# Patient Record
Sex: MALE | Race: WHITE | NOT HISPANIC OR LATINO | Employment: OTHER
[De-identification: names, ages, dates, MRNs, and addresses within clinical notes are randomized per-mention and may not be internally consistent; named-entity substitution may affect disease eponyms.]

---

## 2018-02-23 ENCOUNTER — RX ONLY (RX ONLY)
Age: 73
End: 2018-02-23

## 2018-02-23 ENCOUNTER — DOCTOR'S OFFICE (OUTPATIENT)
Dept: URBAN - NONMETROPOLITAN AREA CLINIC 1 | Facility: CLINIC | Age: 73
Setting detail: OPHTHALMOLOGY
End: 2018-02-23
Payer: COMMERCIAL

## 2018-02-23 DIAGNOSIS — H25.23: ICD-10-CM

## 2018-02-23 DIAGNOSIS — H25.012: ICD-10-CM

## 2018-02-23 DIAGNOSIS — H25.13: ICD-10-CM

## 2018-02-23 DIAGNOSIS — H25.043: ICD-10-CM

## 2018-02-23 DIAGNOSIS — E11.9: ICD-10-CM

## 2018-02-23 PROCEDURE — 92004 COMPRE OPH EXAM NEW PT 1/>: CPT | Performed by: OPHTHALMOLOGY

## 2018-02-23 PROCEDURE — 76512 OPH US DX B-SCAN: CPT | Performed by: OPHTHALMOLOGY

## 2018-02-23 ASSESSMENT — REFRACTION_MANIFEST
OS_VA3: 20/
OS_VA1: 20/
OU_VA: 20/
OS_VA2: 20/
OD_VA1: 20/
OS_VA1: 20/
OS_VA3: 20/
OD_VA3: 20/
OS_VA2: 20/
OD_VA3: 20/
OD_VA3: 20/
OU_VA: 20/
OD_VA2: 20/
OU_VA: 20/
OS_VA1: 20/
OD_VA2: 20/
OD_VA2: 20/
OS_VA2: 20/
OS_VA3: 20/
OD_VA1: 20/
OD_VA1: 20/

## 2018-02-23 ASSESSMENT — CONFRONTATIONAL VISUAL FIELD TEST (CVF)
OS_FINDINGS: FULL
OD_FINDINGS: FULL

## 2018-02-23 ASSESSMENT — REFRACTION_CURRENTRX
OD_OVR_VA: 20/
OD_CYLINDER: -0.25
OS_SPHERE: +0.75
OD_OVR_VA: 20/
OD_ADD: +2.75
OD_AXIS: 068
OS_OVR_VA: 20/
OS_AXIS: 091
OD_SPHERE: +0.75
OS_ADD: +2.75
OD_VPRISM_DIRECTION: BF
OS_VPRISM_DIRECTION: BF
OD_OVR_VA: 20/
OS_CYLINDER: -1.00
OS_OVR_VA: 20/
OS_OVR_VA: 20/

## 2018-02-23 ASSESSMENT — REFRACTION_AUTOREFRACTION
OD_SPHERE: 0.00
OD_CYLINDER: -1.00
OS_CYLINDER: -0.50
OS_AXIS: 092
OS_SPHERE: 0.00
OD_AXIS: 048

## 2018-02-23 ASSESSMENT — SPHEQUIV_DERIVED
OD_SPHEQUIV: -0.5
OS_SPHEQUIV: -0.25

## 2018-02-23 ASSESSMENT — VISUAL ACUITY
OD_BCVA: 20/100-1
OS_BCVA: 20/60+2

## 2018-03-20 ENCOUNTER — DOCTOR'S OFFICE (OUTPATIENT)
Dept: URBAN - NONMETROPOLITAN AREA CLINIC 1 | Facility: CLINIC | Age: 73
Setting detail: OPHTHALMOLOGY
End: 2018-03-20
Payer: COMMERCIAL

## 2018-03-20 DIAGNOSIS — H25.21: ICD-10-CM

## 2018-03-20 PROCEDURE — 92136 OPHTHALMIC BIOMETRY: CPT | Performed by: OPHTHALMOLOGY

## 2018-04-05 ENCOUNTER — AMBUL SURGICAL CARE (OUTPATIENT)
Dept: URBAN - NONMETROPOLITAN AREA SURGERY 1 | Facility: SURGERY | Age: 73
Setting detail: OPHTHALMOLOGY
End: 2018-04-05
Payer: COMMERCIAL

## 2018-04-05 DIAGNOSIS — H25.041: ICD-10-CM

## 2018-04-05 DIAGNOSIS — H25.11: ICD-10-CM

## 2018-04-05 PROCEDURE — 66984 XCAPSL CTRC RMVL W/O ECP: CPT | Performed by: OPHTHALMOLOGY

## 2018-04-05 PROCEDURE — G8918 PT W/O PREOP ORDER IV AB PRO: HCPCS | Performed by: OPHTHALMOLOGY

## 2018-04-05 PROCEDURE — G8907 PT DOC NO EVENTS ON DISCHARG: HCPCS | Performed by: OPHTHALMOLOGY

## 2018-04-06 ENCOUNTER — RX ONLY (RX ONLY)
Age: 73
End: 2018-04-06

## 2018-04-06 ENCOUNTER — DOCTOR'S OFFICE (OUTPATIENT)
Dept: URBAN - NONMETROPOLITAN AREA CLINIC 1 | Facility: CLINIC | Age: 73
Setting detail: OPHTHALMOLOGY
End: 2018-04-06
Payer: COMMERCIAL

## 2018-04-06 ENCOUNTER — DOCTOR'S OFFICE (OUTPATIENT)
Dept: URBAN - NONMETROPOLITAN AREA CLINIC 1 | Facility: CLINIC | Age: 73
Setting detail: OPHTHALMOLOGY
End: 2018-04-06

## 2018-04-06 DIAGNOSIS — Z96.1: ICD-10-CM

## 2018-04-06 DIAGNOSIS — H25.22: ICD-10-CM

## 2018-04-06 DIAGNOSIS — H25.012: ICD-10-CM

## 2018-04-06 DIAGNOSIS — H25.12: ICD-10-CM

## 2018-04-06 DIAGNOSIS — H25.042: ICD-10-CM

## 2018-04-06 PROCEDURE — 99024 POSTOP FOLLOW-UP VISIT: CPT | Performed by: OPTOMETRIST

## 2018-04-06 PROCEDURE — 92136 OPHTHALMIC BIOMETRY: CPT | Performed by: OPHTHALMOLOGY

## 2018-04-06 ASSESSMENT — REFRACTION_CURRENTRX
OS_OVR_VA: 20/
OD_OVR_VA: 20/
OS_OVR_VA: 20/
OS_ADD: +2.75
OD_VPRISM_DIRECTION: BF
OS_CYLINDER: -1.00
OS_SPHERE: +0.75
OS_OVR_VA: 20/
OD_OVR_VA: 20/
OD_ADD: +2.75
OD_AXIS: 068
OS_VPRISM_DIRECTION: BF
OD_OVR_VA: 20/
OD_CYLINDER: -0.25
OS_AXIS: 091
OD_SPHERE: +0.75

## 2018-04-06 ASSESSMENT — VISUAL ACUITY
OD_BCVA: 20/100-1
OS_BCVA: 20/40-2

## 2018-04-06 ASSESSMENT — REFRACTION_MANIFEST
OD_VA3: 20/
OS_VA1: 20/
OD_VA1: 20/
OS_VA1: 20/
OS_VA1: 20/
OS_VA3: 20/
OU_VA: 20/
OS_VA2: 20/
OD_VA1: 20/
OS_VA2: 20/
OD_VA2: 20/
OU_VA: 20/
OD_VA1: 20/
OS_VA3: 20/
OD_VA3: 20/
OU_VA: 20/
OS_VA3: 20/
OD_VA2: 20/
OD_VA2: 20/
OS_VA2: 20/
OD_VA3: 20/

## 2018-04-06 ASSESSMENT — REFRACTION_AUTOREFRACTION
OS_SPHERE: 0.00
OD_SPHERE: +0.50
OD_CYLINDER: -0.25
OS_AXIS: 092
OS_CYLINDER: -0.50
OD_AXIS: 017

## 2018-04-06 ASSESSMENT — SPHEQUIV_DERIVED
OS_SPHEQUIV: -0.25
OD_SPHEQUIV: 0.375

## 2018-04-06 ASSESSMENT — DRY EYES - PHYSICIAN NOTES: OD_GENERALCOMMENTS: SCATTERED INFERIOR SPK

## 2018-04-12 ENCOUNTER — AMBUL SURGICAL CARE (OUTPATIENT)
Dept: URBAN - NONMETROPOLITAN AREA SURGERY 1 | Facility: SURGERY | Age: 73
Setting detail: OPHTHALMOLOGY
End: 2018-04-12
Payer: COMMERCIAL

## 2018-04-12 DIAGNOSIS — H25.12: ICD-10-CM

## 2018-04-12 DIAGNOSIS — H25.012: ICD-10-CM

## 2018-04-12 DIAGNOSIS — H25.042: ICD-10-CM

## 2018-04-12 PROCEDURE — G8907 PT DOC NO EVENTS ON DISCHARG: HCPCS | Performed by: OPHTHALMOLOGY

## 2018-04-12 PROCEDURE — G8918 PT W/O PREOP ORDER IV AB PRO: HCPCS | Performed by: OPHTHALMOLOGY

## 2018-04-12 PROCEDURE — 66984 XCAPSL CTRC RMVL W/O ECP: CPT | Performed by: OPHTHALMOLOGY

## 2018-04-13 ENCOUNTER — DOCTOR'S OFFICE (OUTPATIENT)
Dept: URBAN - NONMETROPOLITAN AREA CLINIC 1 | Facility: CLINIC | Age: 73
Setting detail: OPHTHALMOLOGY
End: 2018-04-13

## 2018-04-13 DIAGNOSIS — Z96.1: ICD-10-CM

## 2018-04-13 PROBLEM — H25.042 PSC POLAR AGE RELATED CATARACT; LEFT EYE: Status: RESOLVED | Noted: 2018-04-06 | Resolved: 2018-04-13

## 2018-04-13 PROBLEM — H25.012 CORTICAL CATARACT; LEFT EYE: Status: RESOLVED | Noted: 2018-02-23 | Resolved: 2018-04-13

## 2018-04-13 PROBLEM — H25.12 CATARACT NUCLEAR SCLEROSIS AGE RELATED; LEFT EYE: Status: RESOLVED | Noted: 2018-04-06 | Resolved: 2018-04-13

## 2018-04-13 PROCEDURE — 99024 POSTOP FOLLOW-UP VISIT: CPT | Performed by: PHYSICIAN ASSISTANT

## 2018-04-13 ASSESSMENT — REFRACTION_CURRENTRX
OS_CYLINDER: -1.00
OD_OVR_VA: 20/
OS_OVR_VA: 20/
OS_OVR_VA: 20/
OD_SPHERE: +0.75
OD_ADD: +2.75
OD_CYLINDER: -0.25
OD_AXIS: 068
OS_ADD: +2.75
OD_OVR_VA: 20/
OS_OVR_VA: 20/
OS_AXIS: 091
OS_SPHERE: +0.75
OS_VPRISM_DIRECTION: BF
OD_VPRISM_DIRECTION: BF
OD_OVR_VA: 20/

## 2018-04-13 ASSESSMENT — REFRACTION_MANIFEST
OD_VA3: 20/
OU_VA: 20/
OU_VA: 20/
OS_VA1: 20/
OS_VA3: 20/
OD_VA2: 20/
OD_VA1: 20/
OU_VA: 20/
OD_VA3: 20/
OD_VA2: 20/
OS_VA2: 20/
OD_VA3: 20/
OD_VA2: 20/
OD_VA1: 20/
OS_VA2: 20/
OS_VA1: 20/
OS_VA3: 20/
OS_VA3: 20/
OS_VA1: 20/
OD_VA1: 20/
OS_VA2: 20/

## 2018-04-13 ASSESSMENT — REFRACTION_AUTOREFRACTION
OS_SPHERE: +0.25
OD_CYLINDER: -1.00
OS_CYLINDER: -1.00
OD_AXIS: 096
OD_SPHERE: +1.00
OS_AXIS: 086

## 2018-04-13 ASSESSMENT — SPHEQUIV_DERIVED
OS_SPHEQUIV: -0.25
OD_SPHEQUIV: 0.5

## 2018-04-13 ASSESSMENT — VISUAL ACUITY
OS_BCVA: 20/20-2
OD_BCVA: 20/70

## 2018-04-25 ENCOUNTER — DOCTOR'S OFFICE (OUTPATIENT)
Dept: URBAN - NONMETROPOLITAN AREA CLINIC 1 | Facility: CLINIC | Age: 73
Setting detail: OPHTHALMOLOGY
End: 2018-04-25

## 2018-04-25 DIAGNOSIS — Z96.1: ICD-10-CM

## 2018-04-25 PROCEDURE — 99024 POSTOP FOLLOW-UP VISIT: CPT | Performed by: PHYSICIAN ASSISTANT

## 2018-04-27 ASSESSMENT — REFRACTION_MANIFEST
OS_VA1: 20/
OD_VA1: 20/
OD_VA2: 20/
OS_VA2: 20/
OS_VA2: 20/
OS_VA1: 20/
OS_VA3: 20/
OS_VA3: 20/
OD_VA3: 20/
OD_VA3: 20/
OD_VA2: 20/
OS_VA1: 20/
OD_VA1: 20/
OD_VA2: 20/
OD_VA1: 20/
OD_VA3: 20/
OU_VA: 20/
OS_VA3: 20/
OS_VA2: 20/
OU_VA: 20/
OU_VA: 20/

## 2018-04-27 ASSESSMENT — VISUAL ACUITY
OD_BCVA: 20/30+1
OS_BCVA: 20/20-2

## 2018-04-27 ASSESSMENT — SPHEQUIV_DERIVED
OD_SPHEQUIV: 0.5
OS_SPHEQUIV: 0.125

## 2018-04-27 ASSESSMENT — REFRACTION_CURRENTRX
OD_OVR_VA: 20/
OS_VPRISM_DIRECTION: BF
OD_CYLINDER: -0.25
OD_VPRISM_DIRECTION: BF
OD_OVR_VA: 20/
OD_ADD: +2.75
OS_OVR_VA: 20/
OS_SPHERE: +0.75
OD_OVR_VA: 20/
OS_OVR_VA: 20/
OS_AXIS: 091
OS_CYLINDER: -1.00
OS_ADD: +2.75
OD_AXIS: 068
OS_OVR_VA: 20/
OD_SPHERE: +0.75

## 2018-04-27 ASSESSMENT — REFRACTION_AUTOREFRACTION
OD_SPHERE: +1.00
OS_CYLINDER: -1.75
OD_CYLINDER: -1.00
OS_SPHERE: +1.00
OS_AXIS: 079
OD_AXIS: 099

## 2018-07-23 ENCOUNTER — DOCTOR'S OFFICE (OUTPATIENT)
Dept: URBAN - NONMETROPOLITAN AREA CLINIC 1 | Facility: CLINIC | Age: 73
Setting detail: OPHTHALMOLOGY
End: 2018-07-23
Payer: COMMERCIAL

## 2018-07-23 DIAGNOSIS — H52.4: ICD-10-CM

## 2018-07-23 PROCEDURE — 92015 DETERMINE REFRACTIVE STATE: CPT | Performed by: OPTOMETRIST

## 2018-07-23 ASSESSMENT — SPHEQUIV_DERIVED
OS_SPHEQUIV: 0.25
OD_SPHEQUIV: 0.375

## 2018-07-23 ASSESSMENT — REFRACTION_OUTSIDERX
OU_VA: 20/
OS_SPHERE: +0.50
OS_VA3: 20/
OS_VA2: 20/25
OD_VA1: 20/25
OS_AXIS: 075
OD_CYLINDER: -0.50
OD_SPHERE: +0.50
OS_ADD: +2.50
OS_VA1: 20/25
OD_AXIS: 090
OS_CYLINDER: -0.75
OD_VA2: 20/25
OD_ADD: +2.50
OD_VA3: 20/

## 2018-07-23 ASSESSMENT — REFRACTION_MANIFEST
OS_VA2: 20/
OS_VA1: 20/
OD_VA2: 20/
OS_VA2: 20/
OD_VA2: 20/
OD_VA3: 20/
OU_VA: 20/
OS_VA3: 20/
OD_VA1: 20/
OD_VA3: 20/
OD_VA1: 20/
OS_VA1: 20/
OS_VA3: 20/
OU_VA: 20/

## 2018-07-23 ASSESSMENT — REFRACTION_CURRENTRX
OD_SPHERE: +0.75
OS_ADD: +2.75
OS_VPRISM_DIRECTION: BF
OD_OVR_VA: 20/
OS_OVR_VA: 20/
OD_VPRISM_DIRECTION: BF
OS_OVR_VA: 20/
OD_AXIS: 068
OD_CYLINDER: -0.25
OS_AXIS: 091
OD_OVR_VA: 20/
OS_OVR_VA: 20/
OS_SPHERE: +0.75
OD_ADD: +2.75
OD_OVR_VA: 20/
OS_CYLINDER: -1.00

## 2018-07-23 ASSESSMENT — REFRACTION_AUTOREFRACTION
OS_CYLINDER: -1.00
OS_AXIS: 076
OD_CYLINDER: -0.75
OD_SPHERE: +0.75
OD_AXIS: 090
OS_SPHERE: +0.75

## 2018-07-23 ASSESSMENT — VISUAL ACUITY
OS_BCVA: 20/25-
OD_BCVA: 20/25-

## 2018-08-06 ENCOUNTER — OPTICAL OFFICE (OUTPATIENT)
Dept: URBAN - NONMETROPOLITAN AREA CLINIC 4 | Facility: CLINIC | Age: 73
Setting detail: OPHTHALMOLOGY
End: 2018-08-06
Payer: COMMERCIAL

## 2018-08-06 DIAGNOSIS — H52.223: ICD-10-CM

## 2018-08-06 PROCEDURE — V2750 ANTI-REFLECTIVE COATING: HCPCS | Performed by: OPTOMETRIST

## 2018-08-06 PROCEDURE — V2203 LENS SPHCYL BIFOCAL 4.00D/.1: HCPCS | Performed by: OPTOMETRIST

## 2018-08-06 PROCEDURE — V2744 TINT PHOTOCHROMATIC LENS/ES: HCPCS | Performed by: OPTOMETRIST

## 2018-08-06 PROCEDURE — V2020 VISION SVCS FRAMES PURCHASES: HCPCS | Performed by: OPTOMETRIST

## 2018-08-16 ENCOUNTER — DOCTOR'S OFFICE (OUTPATIENT)
Dept: URBAN - NONMETROPOLITAN AREA CLINIC 1 | Facility: CLINIC | Age: 73
Setting detail: OPHTHALMOLOGY
End: 2018-08-16
Payer: COMMERCIAL

## 2018-08-16 DIAGNOSIS — E11.9: ICD-10-CM

## 2018-08-16 DIAGNOSIS — H43.813: ICD-10-CM

## 2018-08-16 DIAGNOSIS — H43.812: ICD-10-CM

## 2018-08-16 DIAGNOSIS — H43.811: ICD-10-CM

## 2018-08-16 PROCEDURE — 92134 CPTRZ OPH DX IMG PST SGM RTA: CPT | Performed by: OPHTHALMOLOGY

## 2018-08-16 PROCEDURE — 92225 OPHTHALMOSCOPY EXTENDED INITIAL: CPT | Performed by: OPHTHALMOLOGY

## 2018-08-16 PROCEDURE — 92014 COMPRE OPH EXAM EST PT 1/>: CPT | Performed by: OPHTHALMOLOGY

## 2018-08-16 ASSESSMENT — VISUAL ACUITY
OS_BCVA: 20/30-2
OD_BCVA: 20/30-2

## 2018-08-16 ASSESSMENT — REFRACTION_MANIFEST
OD_VA1: 20/
OD_VA3: 20/
OS_VA1: 20/
OS_VA3: 20/
OU_VA: 20/
OU_VA: 20/
OS_VA2: 20/
OS_VA1: 20/
OD_VA1: 20/
OD_VA3: 20/
OD_VA2: 20/
OS_VA2: 20/
OS_VA3: 20/
OD_VA2: 20/

## 2018-08-16 ASSESSMENT — REFRACTION_AUTOREFRACTION
OS_SPHERE: +0.75
OD_CYLINDER: -0.75
OD_SPHERE: +0.75
OD_AXIS: 090
OS_CYLINDER: -1.00
OS_AXIS: 076

## 2018-08-16 ASSESSMENT — REFRACTION_OUTSIDERX
OD_AXIS: 090
OD_VA3: 20/
OD_ADD: +2.50
OD_VA2: 20/25
OD_VA1: 20/25
OS_VA1: 20/25
OS_SPHERE: +0.50
OS_AXIS: 075
OS_ADD: +2.50
OS_VA2: 20/25
OD_CYLINDER: -0.50
OS_VA3: 20/
OD_SPHERE: +0.50
OS_CYLINDER: -0.75
OU_VA: 20/

## 2018-08-16 ASSESSMENT — REFRACTION_CURRENTRX
OS_SPHERE: +0.75
OD_OVR_VA: 20/
OD_CYLINDER: -0.25
OD_OVR_VA: 20/
OS_CYLINDER: -1.00
OS_OVR_VA: 20/
OD_SPHERE: +0.75
OS_VPRISM_DIRECTION: BF
OS_AXIS: 091
OD_AXIS: 068
OD_VPRISM_DIRECTION: BF
OS_OVR_VA: 20/
OD_ADD: +2.75
OS_OVR_VA: 20/
OD_OVR_VA: 20/
OS_ADD: +2.75

## 2018-08-16 ASSESSMENT — SPHEQUIV_DERIVED
OS_SPHEQUIV: 0.25
OD_SPHEQUIV: 0.375

## 2018-08-16 ASSESSMENT — CONFRONTATIONAL VISUAL FIELD TEST (CVF)
OS_FINDINGS: FULL
OD_FINDINGS: FULL

## 2018-09-17 ENCOUNTER — RX ONLY (RX ONLY)
Age: 73
End: 2018-09-17

## 2018-09-17 ENCOUNTER — DOCTOR'S OFFICE (OUTPATIENT)
Dept: URBAN - NONMETROPOLITAN AREA CLINIC 1 | Facility: CLINIC | Age: 73
Setting detail: OPHTHALMOLOGY
End: 2018-09-17
Payer: COMMERCIAL

## 2018-09-17 DIAGNOSIS — H43.813: ICD-10-CM

## 2018-09-17 DIAGNOSIS — E11.3393: ICD-10-CM

## 2018-09-17 DIAGNOSIS — H43.812: ICD-10-CM

## 2018-09-17 DIAGNOSIS — H43.811: ICD-10-CM

## 2018-09-17 DIAGNOSIS — H43.11: ICD-10-CM

## 2018-09-17 PROCEDURE — 92134 CPTRZ OPH DX IMG PST SGM RTA: CPT | Performed by: OPHTHALMOLOGY

## 2018-09-17 PROCEDURE — 92226 OPHTHALMOSCOPY EXT SUBSEQUENT: CPT | Performed by: OPHTHALMOLOGY

## 2018-09-17 PROCEDURE — 92014 COMPRE OPH EXAM EST PT 1/>: CPT | Performed by: OPHTHALMOLOGY

## 2018-09-17 ASSESSMENT — REFRACTION_CURRENTRX
OD_ADD: +2.75
OS_OVR_VA: 20/
OS_OVR_VA: 20/
OD_CYLINDER: -0.25
OD_OVR_VA: 20/
OD_OVR_VA: 20/
OD_VPRISM_DIRECTION: BF
OS_SPHERE: +0.75
OS_VPRISM_DIRECTION: BF
OD_OVR_VA: 20/
OS_ADD: +2.75
OS_CYLINDER: -1.00
OD_SPHERE: +0.75
OS_OVR_VA: 20/
OD_AXIS: 068
OS_AXIS: 091

## 2018-09-17 ASSESSMENT — REFRACTION_MANIFEST
OS_VA3: 20/
OD_VA3: 20/
OS_SPHERE: +0.50
OS_CYLINDER: -0.75
OS_VA1: 20/25
OD_ADD: +2.50
OU_VA: 20/
OD_CYLINDER: -0.50
OD_VA1: 20/
OD_VA2: 20/25
OS_VA1: 20/
OD_VA1: 20/25
OS_ADD: +2.50
OD_AXIS: 090
OU_VA: 20/
OS_VA3: 20/
OS_VA2: 20/
OD_VA3: 20/
OD_VA2: 20/
OS_AXIS: 075
OD_SPHERE: +0.50
OS_VA2: 20/25

## 2018-09-17 ASSESSMENT — SPHEQUIV_DERIVED
OD_SPHEQUIV: 0.375
OS_SPHEQUIV: 0.25
OD_SPHEQUIV: 0.25
OS_SPHEQUIV: 0.125

## 2018-09-17 ASSESSMENT — REFRACTION_AUTOREFRACTION
OD_CYLINDER: -0.75
OS_CYLINDER: -1.00
OS_SPHERE: +0.75
OD_SPHERE: +0.75
OS_AXIS: 076
OD_AXIS: 090

## 2018-09-17 ASSESSMENT — VISUAL ACUITY
OS_BCVA: 20/40+2
OD_BCVA: 20/25-2

## 2018-09-17 ASSESSMENT — CONFRONTATIONAL VISUAL FIELD TEST (CVF)
OS_FINDINGS: FULL
OD_FINDINGS: FULL

## 2018-10-08 ENCOUNTER — DOCTOR'S OFFICE (OUTPATIENT)
Dept: URBAN - NONMETROPOLITAN AREA CLINIC 1 | Facility: CLINIC | Age: 73
Setting detail: OPHTHALMOLOGY
End: 2018-10-08
Payer: COMMERCIAL

## 2018-10-08 DIAGNOSIS — H43.11: ICD-10-CM

## 2018-10-08 DIAGNOSIS — H43.813: ICD-10-CM

## 2018-10-08 DIAGNOSIS — E11.3393: ICD-10-CM

## 2018-10-08 PROCEDURE — 92250 FUNDUS PHOTOGRAPHY W/I&R: CPT | Performed by: OPHTHALMOLOGY

## 2018-10-08 PROCEDURE — 92235 FLUORESCEIN ANGRPH MLTIFRAME: CPT | Performed by: OPHTHALMOLOGY

## 2018-10-08 PROCEDURE — 92014 COMPRE OPH EXAM EST PT 1/>: CPT | Performed by: OPHTHALMOLOGY

## 2018-10-08 ASSESSMENT — REFRACTION_MANIFEST
OS_VA3: 20/
OS_CYLINDER: -0.75
OD_SPHERE: +0.50
OD_VA1: 20/25
OD_ADD: +2.50
OD_VA3: 20/
OS_VA2: 20/
OD_VA2: 20/
OS_VA1: 20/
OD_VA2: 20/25
OD_AXIS: 090
OU_VA: 20/
OS_SPHERE: +0.50
OS_VA2: 20/25
OD_CYLINDER: -0.50
OS_VA3: 20/
OD_VA3: 20/
OD_VA1: 20/
OS_ADD: +2.50
OU_VA: 20/
OS_VA1: 20/25
OS_AXIS: 075

## 2018-10-08 ASSESSMENT — SPHEQUIV_DERIVED
OS_SPHEQUIV: 0.125
OD_SPHEQUIV: 0.25
OD_SPHEQUIV: 0.375
OS_SPHEQUIV: 0.25

## 2018-10-08 ASSESSMENT — VISUAL ACUITY
OD_BCVA: 20/25-2
OS_BCVA: 20/40+2

## 2018-10-08 ASSESSMENT — REFRACTION_CURRENTRX
OS_VPRISM_DIRECTION: BF
OS_SPHERE: +0.75
OS_AXIS: 091
OD_ADD: +2.75
OS_ADD: +2.75
OS_CYLINDER: -1.00
OS_OVR_VA: 20/
OD_AXIS: 068
OS_OVR_VA: 20/
OD_VPRISM_DIRECTION: BF
OD_OVR_VA: 20/
OD_SPHERE: +0.75
OD_OVR_VA: 20/
OD_CYLINDER: -0.25
OD_OVR_VA: 20/
OS_OVR_VA: 20/

## 2018-10-08 ASSESSMENT — REFRACTION_AUTOREFRACTION
OD_CYLINDER: -0.75
OD_SPHERE: +0.75
OS_SPHERE: +0.75
OD_AXIS: 090
OS_AXIS: 076
OS_CYLINDER: -1.00

## 2018-10-08 ASSESSMENT — CONFRONTATIONAL VISUAL FIELD TEST (CVF)
OS_FINDINGS: FULL
OD_FINDINGS: FULL

## 2018-10-24 ENCOUNTER — DOCTOR'S OFFICE (OUTPATIENT)
Dept: URBAN - NONMETROPOLITAN AREA CLINIC 1 | Facility: CLINIC | Age: 73
Setting detail: OPHTHALMOLOGY
End: 2018-10-24
Payer: COMMERCIAL

## 2018-10-24 DIAGNOSIS — H26.493: ICD-10-CM

## 2018-10-24 DIAGNOSIS — E11.3393: ICD-10-CM

## 2018-10-24 DIAGNOSIS — H43.11: ICD-10-CM

## 2018-10-24 DIAGNOSIS — H43.813: ICD-10-CM

## 2018-10-24 DIAGNOSIS — Z96.1: ICD-10-CM

## 2018-10-24 PROCEDURE — 92134 CPTRZ OPH DX IMG PST SGM RTA: CPT | Performed by: OPHTHALMOLOGY

## 2018-10-24 PROCEDURE — 92014 COMPRE OPH EXAM EST PT 1/>: CPT | Performed by: OPHTHALMOLOGY

## 2018-10-24 ASSESSMENT — CONFRONTATIONAL VISUAL FIELD TEST (CVF)
OS_FINDINGS: FULL
OD_FINDINGS: FULL

## 2018-10-26 ASSESSMENT — REFRACTION_MANIFEST
OS_VA3: 20/
OU_VA: 20/
OS_AXIS: 075
OD_SPHERE: +0.50
OS_VA2: 20/
OD_VA2: 20/
OD_CYLINDER: -0.50
OS_VA3: 20/
OS_ADD: +2.50
OD_VA1: 20/25
OD_VA1: 20/
OD_ADD: +2.50
OS_VA1: 20/
OD_VA3: 20/
OD_AXIS: 090
OS_VA2: 20/25
OD_VA2: 20/25
OU_VA: 20/
OS_SPHERE: +0.50
OS_CYLINDER: -0.75
OD_VA3: 20/
OS_VA1: 20/25

## 2018-10-26 ASSESSMENT — REFRACTION_CURRENTRX
OD_OVR_VA: 20/
OS_ADD: +2.50
OD_OVR_VA: 20/
OS_OVR_VA: 20/
OS_OVR_VA: 20/
OS_SPHERE: +0.50
OD_SPHERE: +0.50
OS_CYLINDER: -0.75
OD_AXIS: 091
OD_ADD: +2.50
OD_VPRISM_DIRECTION: BF
OD_CYLINDER: -0.50
OS_AXIS: 078
OD_OVR_VA: 20/
OS_VPRISM_DIRECTION: BF
OS_OVR_VA: 20/

## 2018-10-26 ASSESSMENT — SPHEQUIV_DERIVED
OD_SPHEQUIV: -0.125
OS_SPHEQUIV: 0.125
OD_SPHEQUIV: 0.25
OS_SPHEQUIV: 0.375

## 2018-10-26 ASSESSMENT — REFRACTION_AUTOREFRACTION
OD_AXIS: 042
OS_SPHERE: +0.75
OD_SPHERE: +0.25
OS_AXIS: 075
OS_CYLINDER: -0.75
OD_CYLINDER: -0.75

## 2018-10-26 ASSESSMENT — VISUAL ACUITY
OD_BCVA: 20/25+1
OS_BCVA: 20/40-2

## 2021-08-13 ENCOUNTER — DOCTOR'S OFFICE (OUTPATIENT)
Dept: URBAN - NONMETROPOLITAN AREA CLINIC 1 | Facility: CLINIC | Age: 76
Setting detail: OPHTHALMOLOGY
End: 2021-08-13
Payer: COMMERCIAL

## 2021-08-13 VITALS — HEIGHT: 60 IN

## 2021-08-13 DIAGNOSIS — H43.813: ICD-10-CM

## 2021-08-13 DIAGNOSIS — Z96.1: ICD-10-CM

## 2021-08-13 DIAGNOSIS — E11.3393: ICD-10-CM

## 2021-08-13 PROCEDURE — 92134 CPTRZ OPH DX IMG PST SGM RTA: CPT | Performed by: OPHTHALMOLOGY

## 2021-08-13 PROCEDURE — 92014 COMPRE OPH EXAM EST PT 1/>: CPT | Performed by: OPHTHALMOLOGY

## 2021-08-13 ASSESSMENT — REFRACTION_AUTOREFRACTION
OD_AXIS: 093
OS_CYLINDER: -0.50
OS_SPHERE: +0.50
OS_AXIS: 063
OD_CYLINDER: -1.00
OD_SPHERE: +1.25

## 2021-08-13 ASSESSMENT — REFRACTION_MANIFEST
OS_VA2: 20/25
OD_CYLINDER: -0.50
OS_CYLINDER: -0.75
OD_AXIS: 090
OD_ADD: +2.50
OD_VA1: 20/25
OD_VA2: 20/25
OS_VA1: 20/25
OS_ADD: +2.50
OS_AXIS: 075
OD_SPHERE: +0.50
OS_SPHERE: +0.50

## 2021-08-13 ASSESSMENT — REFRACTION_CURRENTRX
OS_VPRISM_DIRECTION: BF
OS_SPHERE: +0.50
OS_ADD: +2.50
OS_OVR_VA: 20/
OD_AXIS: 091
OS_AXIS: 078
OS_CYLINDER: -0.75
OD_CYLINDER: -0.50
OD_SPHERE: +0.50
OD_ADD: +2.50
OD_OVR_VA: 20/
OD_VPRISM_DIRECTION: BF

## 2021-08-13 ASSESSMENT — VISUAL ACUITY
OD_BCVA: 20/25+2
OS_BCVA: 20/25

## 2021-08-13 ASSESSMENT — SPHEQUIV_DERIVED
OD_SPHEQUIV: 0.75
OS_SPHEQUIV: 0.25
OD_SPHEQUIV: 0.25
OS_SPHEQUIV: 0.125

## 2021-08-13 ASSESSMENT — CONFRONTATIONAL VISUAL FIELD TEST (CVF)
OD_FINDINGS: FULL
OS_FINDINGS: FULL

## 2021-09-14 ENCOUNTER — AMBUL SURGICAL CARE (OUTPATIENT)
Dept: URBAN - NONMETROPOLITAN AREA SURGERY 1 | Facility: SURGERY | Age: 76
Setting detail: OPHTHALMOLOGY
End: 2021-09-14
Payer: COMMERCIAL

## 2021-09-14 DIAGNOSIS — H26.491: ICD-10-CM

## 2021-09-14 PROCEDURE — G8918 PT W/O PREOP ORDER IV AB PRO: HCPCS | Performed by: OPHTHALMOLOGY

## 2021-09-14 PROCEDURE — G8907 PT DOC NO EVENTS ON DISCHARG: HCPCS | Performed by: OPHTHALMOLOGY

## 2021-09-14 PROCEDURE — 66821 AFTER CATARACT LASER SURGERY: CPT | Performed by: OPHTHALMOLOGY

## 2021-11-09 ENCOUNTER — AMBUL SURGICAL CARE (OUTPATIENT)
Dept: URBAN - NONMETROPOLITAN AREA SURGERY 1 | Facility: SURGERY | Age: 76
Setting detail: OPHTHALMOLOGY
End: 2021-11-09
Payer: COMMERCIAL

## 2021-11-09 DIAGNOSIS — H26.492: ICD-10-CM

## 2021-11-09 PROBLEM — H43.811 POST VITREOUS DETACHMENT; RIGHT EYE, LEFT EYE: Status: ACTIVE | Noted: 2018-08-16

## 2021-11-09 PROBLEM — E11.3393 DM TYPE 2; BOTH MOD WITHOUT ME; 
BOTH EYES: Status: ACTIVE | Noted: 2018-09-17

## 2021-11-09 PROBLEM — H43.812 POST VITREOUS DETACHMENT; RIGHT EYE, LEFT EYE: Status: ACTIVE | Noted: 2018-08-16

## 2021-11-09 PROBLEM — H43.11 VITREOUS HEMORRHAGE; RIGHT EYE: Status: ACTIVE | Noted: 2018-09-17

## 2021-11-09 PROBLEM — Z96.1: Status: ACTIVE | Noted: 2018-04-06

## 2021-11-09 PROBLEM — H43.813 POSTERIOR VITREOUS DETACHMENT; BOTH EYES: Status: ACTIVE | Noted: 2018-08-16

## 2021-11-09 PROCEDURE — G8907 PT DOC NO EVENTS ON DISCHARG: HCPCS | Performed by: OPHTHALMOLOGY

## 2021-11-09 PROCEDURE — 66821 AFTER CATARACT LASER SURGERY: CPT | Performed by: OPHTHALMOLOGY

## 2021-11-09 PROCEDURE — G8918 PT W/O PREOP ORDER IV AB PRO: HCPCS | Performed by: OPHTHALMOLOGY

## 2022-05-21 ENCOUNTER — HOSPITAL ENCOUNTER (EMERGENCY)
Facility: HOSPITAL | Age: 77
Discharge: HOME | End: 2022-05-21
Attending: STUDENT IN AN ORGANIZED HEALTH CARE EDUCATION/TRAINING PROGRAM
Payer: COMMERCIAL

## 2022-05-21 VITALS
HEART RATE: 65 BPM | BODY MASS INDEX: 37.91 KG/M2 | DIASTOLIC BLOOD PRESSURE: 63 MMHG | TEMPERATURE: 98.4 F | OXYGEN SATURATION: 93 % | RESPIRATION RATE: 17 BRPM | HEIGHT: 69 IN | SYSTOLIC BLOOD PRESSURE: 134 MMHG | WEIGHT: 255.95 LBS

## 2022-05-21 DIAGNOSIS — H61.23 BILATERAL IMPACTED CERUMEN: Primary | ICD-10-CM

## 2022-05-21 PROCEDURE — 69209 REMOVE IMPACTED EAR WAX UNI: CPT | Performed by: STUDENT IN AN ORGANIZED HEALTH CARE EDUCATION/TRAINING PROGRAM

## 2022-05-21 PROCEDURE — 99282 EMERGENCY DEPT VISIT SF MDM: CPT | Performed by: STUDENT IN AN ORGANIZED HEALTH CARE EDUCATION/TRAINING PROGRAM

## 2022-05-21 PROCEDURE — 99282 EMERGENCY DEPT VISIT SF MDM: CPT

## 2022-05-21 NOTE — ED PROVIDER NOTES
History  Chief Complaint   Patient presents with    Earache     L ear pain for 1 week  Denies fevers       History provided by:  Patient  Earache  Location:  Left  Behind ear:  No abnormality  Quality:  Sharp  Severity:  Moderate  Onset quality:  Gradual  Duration:  1 week  Timing:  Constant  Progression:  Worsening  Chronicity:  New  Context: not direct blow, not recent URI and not water in ear    Associated symptoms: no congestion, no ear discharge, no fever and no rhinorrhea       79-year-old male  Presents to the emergency department with left ear pain  He states that his ear pain has been worsening over the past 1 week  He has also been having associated hearing loss  Has been taking Tylenol without relief  Denies fever/chills, direct trauma, water in the ear, recent URI  No past medical history on file  No past surgical history on file  No family history on file  I have reviewed and agree with the history as documented  No existing history information found  No existing history information found  Review of Systems   Constitutional: Negative for chills and fever  HENT: Positive for ear pain  Negative for congestion, ear discharge, rhinorrhea, sinus pressure and sinus pain  All other systems reviewed and are negative  Physical Exam  Physical Exam  Vitals and nursing note reviewed  Exam conducted with a chaperone present  Constitutional:       General: He is not in acute distress  Appearance: He is not ill-appearing or toxic-appearing  HENT:      Head: Normocephalic and atraumatic  Right Ear: There is impacted cerumen  Left Ear: There is impacted cerumen  Nose: No congestion or rhinorrhea  Eyes:      General:         Right eye: No discharge  Left eye: No discharge  Extraocular Movements: Extraocular movements intact  Conjunctiva/sclera: Conjunctivae normal    Cardiovascular:      Rate and Rhythm: Normal rate and regular rhythm  Pulses: Normal pulses  Heart sounds: Normal heart sounds  Pulmonary:      Effort: Pulmonary effort is normal  No respiratory distress  Breath sounds: Normal breath sounds  No stridor  No wheezing, rhonchi or rales  Chest:      Chest wall: No tenderness  Abdominal:      General: Bowel sounds are normal       Palpations: Abdomen is soft  Tenderness: There is no abdominal tenderness  There is no right CVA tenderness, left CVA tenderness, guarding or rebound  Skin:     General: Skin is warm and dry  Capillary Refill: Capillary refill takes less than 2 seconds  Coloration: Skin is not jaundiced or pale  Findings: No bruising, erythema or lesion  Neurological:      General: No focal deficit present  Mental Status: He is alert and oriented to person, place, and time  Mental status is at baseline  Cranial Nerves: No cranial nerve deficit  Sensory: No sensory deficit  Motor: No weakness  Psychiatric:         Mood and Affect: Mood normal          Behavior: Behavior normal          Thought Content: Thought content normal          Judgment: Judgment normal        Vital Signs  ED Triage Vitals   Temp Pulse Resp BP SpO2   -- -- -- -- --      Temp src Heart Rate Source Patient Position - Orthostatic VS BP Location FiO2 (%)   -- -- -- -- --      Pain Score       --           There were no vitals filed for this visit  Visual Acuity      ED Medications  Medications - No data to display    Diagnostic Studies  Results Reviewed     None             Procedures  Ear cerumen removal    Date/Time: 5/21/2022 7:35 PM  Performed by: Jen An DO  Authorized by: Jen An DO   Universal Protocol:  Consent: Verbal consent obtained    Consent given by: patient  Patient understanding: patient states understanding of the procedure being performed  Site marked: the operative site was marked    Patient location:  ED  Indications / Diagnosis:  Bilateral cerumen impaction  Procedure details:     Local anesthetic:  None    Location:  R ear and L ear    Procedure type: irrigation only      Approach:  External  Post-procedure details:     Complication:  None    Hearing quality:  Improved    Patient tolerance of procedure: Tolerated well, no immediate complications      ED Course     MDM     68-year-old male  Presents to the emergency department with left ear pain, decreased hearing x1 week  On exam, the patient has bilateral cerumen impaction  Bilateral cerumen removal was performed at the bedside  See procedural note  The ears were flushed out with a mixture of warm water and hydrogen peroxide via syringe  A large amount of cerumen was removed from both ears  Discomfort and hearing improved s/p cerumen removal  The patient had mild residual cerumen buildup in the left ear but the TM was able to be visualized  No signs of tympanic perforation  Debrox drops and PCP follow up recommended  Return precautions were discussed  The patient was stable for discharge  Disposition  Final diagnoses:   Bilateral impacted cerumen     Time reflects when diagnosis was documented in both MDM as applicable and the Disposition within this note     Time User Action Codes Description Comment    5/21/2022  7:52 PM Lizzie Patel Add [B35 04] Bilateral impacted cerumen       ED Disposition     ED Disposition   Discharge    Condition   Stable    Date/Time   Sat May 21, 2022  7:52 PM    Comment   Corbin Howell discharge to home/self care  Follow-up Information    None         Patient's Medications    No medications on file       No discharge procedures on file      PDMP Review     None          ED Provider  Electronically Signed by           Sunny Diaz DO  05/21/22 2043

## 2022-10-19 ENCOUNTER — DOCTOR'S OFFICE (OUTPATIENT)
Dept: URBAN - NONMETROPOLITAN AREA CLINIC 1 | Facility: CLINIC | Age: 77
Setting detail: OPHTHALMOLOGY
End: 2022-10-19
Payer: COMMERCIAL

## 2022-10-19 VITALS — HEIGHT: 60 IN

## 2022-10-19 DIAGNOSIS — E11.3293: ICD-10-CM

## 2022-10-19 DIAGNOSIS — H43.813: ICD-10-CM

## 2022-10-19 DIAGNOSIS — Z96.1: ICD-10-CM

## 2022-10-19 DIAGNOSIS — H02.834: ICD-10-CM

## 2022-10-19 DIAGNOSIS — H02.831: ICD-10-CM

## 2022-10-19 DIAGNOSIS — H35.373: ICD-10-CM

## 2022-10-19 PROBLEM — H26.491 POSTERIOR CAPSULE OPACIFIED; RIGHT EYE, LEFT EYE: Status: RESOLVED | Noted: 2022-10-19 | Resolved: 2022-10-19

## 2022-10-19 PROBLEM — H26.492 POSTERIOR CAPSULE OPACIFIED; RIGHT EYE, LEFT EYE: Status: RESOLVED | Noted: 2022-10-19 | Resolved: 2022-10-19

## 2022-10-19 PROCEDURE — 99214 OFFICE O/P EST MOD 30 MIN: CPT | Performed by: OPHTHALMOLOGY

## 2022-10-19 PROCEDURE — 92134 CPTRZ OPH DX IMG PST SGM RTA: CPT | Performed by: OPHTHALMOLOGY

## 2022-10-19 ASSESSMENT — SPHEQUIV_DERIVED
OD_SPHEQUIV: 0.25
OS_SPHEQUIV: 0.25
OS_SPHEQUIV: 0.125
OD_SPHEQUIV: 0.75

## 2022-10-19 ASSESSMENT — REFRACTION_AUTOREFRACTION
OS_CYLINDER: -0.50
OD_SPHERE: +1.25
OD_CYLINDER: -1.00
OS_AXIS: 063
OD_AXIS: 093
OS_SPHERE: +0.50

## 2022-10-19 ASSESSMENT — REFRACTION_MANIFEST
OD_VA2: 20/25
OS_VA1: 20/25
OD_VA1: 20/25
OD_AXIS: 090
OS_SPHERE: +0.50
OD_CYLINDER: -0.50
OD_ADD: +2.50
OS_AXIS: 075
OS_VA2: 20/25
OD_SPHERE: +0.50
OS_ADD: +2.50
OS_CYLINDER: -0.75

## 2022-10-19 ASSESSMENT — REFRACTION_CURRENTRX
OD_ADD: +2.50
OS_CYLINDER: -0.75
OD_OVR_VA: 20/
OS_VPRISM_DIRECTION: BF
OD_SPHERE: +0.50
OS_ADD: +2.50
OD_AXIS: 091
OS_AXIS: 078
OD_VPRISM_DIRECTION: BF
OS_OVR_VA: 20/
OD_CYLINDER: -0.50
OS_SPHERE: +0.50

## 2022-10-19 ASSESSMENT — VISUAL ACUITY
OS_BCVA: 20/25+1
OD_BCVA: 20/25+1

## 2022-10-19 ASSESSMENT — LID POSITION - DERMATOCHALASIS
OD_DERMATOCHALASIS: RUL
OS_DERMATOCHALASIS: LUL

## 2023-10-11 ENCOUNTER — DOCTOR'S OFFICE (OUTPATIENT)
Dept: URBAN - NONMETROPOLITAN AREA CLINIC 1 | Facility: CLINIC | Age: 78
Setting detail: OPHTHALMOLOGY
End: 2023-10-11
Payer: COMMERCIAL

## 2023-10-11 DIAGNOSIS — H02.834: ICD-10-CM

## 2023-10-11 DIAGNOSIS — H35.373: ICD-10-CM

## 2023-10-11 DIAGNOSIS — E11.3293: ICD-10-CM

## 2023-10-11 DIAGNOSIS — H02.831: ICD-10-CM

## 2023-10-11 DIAGNOSIS — H43.813: ICD-10-CM

## 2023-10-11 DIAGNOSIS — Z96.1: ICD-10-CM

## 2023-10-11 PROCEDURE — 92134 CPTRZ OPH DX IMG PST SGM RTA: CPT | Performed by: OPHTHALMOLOGY

## 2023-10-11 PROCEDURE — 99214 OFFICE O/P EST MOD 30 MIN: CPT | Performed by: OPHTHALMOLOGY

## 2023-10-11 ASSESSMENT — REFRACTION_MANIFEST
OD_ADD: +2.50
OD_CYLINDER: -0.50
OS_CYLINDER: -0.75
OS_AXIS: 075
OS_VA2: 20/25
OS_VA1: 20/25
OS_ADD: +2.50
OD_SPHERE: +0.50
OS_SPHERE: +0.50
OD_VA2: 20/25
OD_VA1: 20/25
OD_AXIS: 090

## 2023-10-11 ASSESSMENT — REFRACTION_CURRENTRX
OS_OVR_VA: 20/
OS_VPRISM_DIRECTION: BF
OD_SPHERE: +0.50
OD_OVR_VA: 20/
OD_CYLINDER: -0.50
OD_VPRISM_DIRECTION: BF
OD_ADD: +2.50
OS_AXIS: 078
OS_ADD: +2.50
OS_CYLINDER: -0.75
OD_AXIS: 091
OS_SPHERE: +0.50

## 2023-10-11 ASSESSMENT — CONFRONTATIONAL VISUAL FIELD TEST (CVF)
OS_FINDINGS: FULL
OD_FINDINGS: FULL

## 2023-10-11 ASSESSMENT — REFRACTION_AUTOREFRACTION
OS_SPHERE: +0.50
OD_SPHERE: +1.25
OS_AXIS: 063
OS_CYLINDER: -0.50
OD_CYLINDER: -1.00
OD_AXIS: 093

## 2023-10-11 ASSESSMENT — SPHEQUIV_DERIVED
OS_SPHEQUIV: 0.25
OS_SPHEQUIV: 0.125
OD_SPHEQUIV: 0.75
OD_SPHEQUIV: 0.25

## 2023-10-11 ASSESSMENT — VISUAL ACUITY
OD_BCVA: 20/25-2
OS_BCVA: 20/30+1

## 2023-10-11 ASSESSMENT — LID POSITION - DERMATOCHALASIS
OS_DERMATOCHALASIS: LUL
OD_DERMATOCHALASIS: RUL